# Patient Record
Sex: FEMALE | Race: WHITE
[De-identification: names, ages, dates, MRNs, and addresses within clinical notes are randomized per-mention and may not be internally consistent; named-entity substitution may affect disease eponyms.]

---

## 2017-06-16 ENCOUNTER — HOSPITAL ENCOUNTER (OUTPATIENT)
Dept: HOSPITAL 47 - RADMRIMAIN | Age: 27
Discharge: HOME | End: 2017-06-16
Payer: COMMERCIAL

## 2017-06-16 DIAGNOSIS — Z88.0: ICD-10-CM

## 2017-06-16 DIAGNOSIS — M51.27: Primary | ICD-10-CM

## 2017-06-16 DIAGNOSIS — M48.8X6: ICD-10-CM

## 2017-06-16 PROCEDURE — 72148 MRI LUMBAR SPINE W/O DYE: CPT

## 2017-06-16 NOTE — MR
EXAMINATION TYPE: MR lumbar spine wo con

 

DATE OF EXAM: 6/16/2017

 

COMPARISON: NONE

 

HISTORY: Lumbago pain and stiffness

 

CONTRAST: 0 mL intravenous .

 

TECHNIQUE: 

Multiplanar, multisequence images of the lumbar spine were acquired.

 

FINDINGS:  Cord terminates L1-L2 level.

 

L5-S1: There is a small central protrusion.  No spinal canal stenosis.  No foraminal stenosis.  Mild 
facet hypertrophy is present..

 

L4-L5: No significant disc bulge or disc herniation.  No spinal canal stenosis.  No foraminal stenosi
s.  Facet hypertrophy is contributing to posterior lateral thecal sac compression..

 

L3-L4: No significant disc bulge or disc herniation.  No spinal canal stenosis.  No foraminal stenosi
s. .

 

L2-L3: No significant disc bulge or disc herniation.  No spinal canal stenosis.  No foraminal stenosi
s. .

 

L1-L2: No significant disc bulge or disc herniation.  No spinal canal stenosis.  No foraminal stenosi
s. .

 

T12-L1: No significant disc bulge or disc herniation.  No spinal canal stenosis.  No foraminal stenos
is.

 

Cord signal in the distal spinal cord appears normal.

 

IMPRESSION:

1. Facet hypertrophy is mild posterior lateral thecal sac compression L4-5.

2. Mild central protrusion with mild anterior thecal sac compression L5-S1.

## 2018-02-22 ENCOUNTER — HOSPITAL ENCOUNTER (OUTPATIENT)
Dept: HOSPITAL 47 - RADUSWWP | Age: 28
Discharge: HOME | End: 2018-02-22
Payer: COMMERCIAL

## 2018-02-22 DIAGNOSIS — N92.0: Primary | ICD-10-CM

## 2018-02-22 PROCEDURE — 76856 US EXAM PELVIC COMPLETE: CPT

## 2018-02-22 NOTE — US
EXAMINATION TYPE: US pelvic complete

 

DATE OF EXAM: 2018

 

COMPARISON: NONE

 

CLINICAL HISTORY: 27-year-old female N92.8 Excessive And Frequent Regular Cycle. Heavy painful period
s x 5 years,

 

 3, para 3, history of tubal ligation

 

TECHNIQUE:  Transabdominal sonographic images of the pelvis were acquired.  

 

Date of LMP:  2018

 

FINDINGS:

 

Uterus:  Anteverted measuring 8.3 x 4.0 x 5.2 cm. Cervical nabothian cyst is demonstrated.

 

Endometrial Stripe: 1.0 cm, within normal limits.

 

Right Ovary:  2.9 x 2.4 x 1.7 cm, within normal limits.

 

Left Ovary:  5.0 x 2.6 x 1.9 cm for a volume of 12.9 mL. There is follicular change with a dominant f
ollicle/functional cyst measuring up to 1.9 cm.

 

No evident adnexal abnormality. Small amount of cul-de-sac free fluid likely physiologic.

 

 

 

 

 

IMPRESSION: 

1. Follicular change in the left ovary with a 1.9 cm dominant follicle or functional cyst.

2. Small amount of cul-de-sac free fluid likely physiologic.

## 2018-10-29 ENCOUNTER — HOSPITAL ENCOUNTER (OUTPATIENT)
Dept: HOSPITAL 47 - RADMRIMAIN | Age: 28
End: 2018-10-29
Attending: PSYCHIATRY & NEUROLOGY
Payer: COMMERCIAL

## 2018-10-29 DIAGNOSIS — R90.89: Primary | ICD-10-CM

## 2018-10-29 PROCEDURE — 70553 MRI BRAIN STEM W/O & W/DYE: CPT

## 2018-10-29 NOTE — MR
EXAMINATION TYPE: MR brain wo/w con

 

DATE OF EXAM: 10/29/2018

 

COMPARISON: Prior MRI brain April 14, 2016. CT brain June 26, 2016.

 

HISTORY: White matter changes, migraines headaches.

 

TECHNIQUE: 

Multiplanar, multisequence images of the brain and brainstem is performed without and with IV contras
t, utilizing 5 mL intravenous Gadavist gadolinium contrast is administered intravenously.  Demyelinat
ing disease protocol with additional Sagittal Flair sequence performed.

 

FINDINGS: 

T2 Lesions Present :  Yes

Approximate Number of Lesions: Approximately 4 on current study

Locations Identified : Scattered

Size of Reference Lesion(s): 

1. 6 x 2 x 4 mm left frontal lesion on axial image 17 and sagittal image 12 stable

New punctate lesion identified sagittal image 19 right frontal lobe and sagittal image 22 right occip
ital lobe

Enhancing Lesion(s) Present: No

T1 Hypointense Lesion(s) Present: No

Change from Prior: 2 -3 additional lesions may be product of interval change in technique versus new 
lesions.

 

Diffusion weighted images demonstrate no evidence of a recent infarct or other diffusion abnormality.
  There is no worrisome extra-axial fluid collection.  The ventricular system and cisternal spaces ar
e normal in size and appearance.  The brain volume is age appropriate.

 

Midline structures demonstrate normal morphology.  The craniocervical junction appears within normal 
limits.  Post contrast images demonstrate no abnormal enhancement. The dural venous sinuses appear pa
tent. Stable tiny mucous retention cyst or polyp anterior right maxillary sinusitis image 7. Patchy f
luid signal deep left mastoid air cells redemonstrated. Globes are intact bilaterally.

 

IMPRESSION: Minimal nonspecific white matter changes with possible new 2-3 small lesions. No enhancin
g lesions are seen.

## 2023-10-23 ENCOUNTER — HOSPITAL ENCOUNTER (EMERGENCY)
Dept: HOSPITAL 47 - EC | Age: 33
Discharge: HOME | End: 2023-10-23
Payer: COMMERCIAL

## 2023-10-23 VITALS — TEMPERATURE: 98 F | RESPIRATION RATE: 18 BRPM

## 2023-10-23 VITALS — SYSTOLIC BLOOD PRESSURE: 125 MMHG | DIASTOLIC BLOOD PRESSURE: 82 MMHG | HEART RATE: 76 BPM

## 2023-10-23 DIAGNOSIS — F17.200: ICD-10-CM

## 2023-10-23 DIAGNOSIS — F41.9: ICD-10-CM

## 2023-10-23 DIAGNOSIS — Z88.8: ICD-10-CM

## 2023-10-23 DIAGNOSIS — F90.9: ICD-10-CM

## 2023-10-23 DIAGNOSIS — S90.31XA: Primary | ICD-10-CM
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
## 2023-10-23 PROCEDURE — 29515 APPLICATION SHORT LEG SPLINT: CPT

## 2023-10-23 PROCEDURE — 99283 EMERGENCY DEPT VISIT LOW MDM: CPT

## 2023-10-23 NOTE — ED
General Adult HPI





- General


Chief complaint: Extremity Injury, Lower


Stated complaint: FALL, RIGHT FOOT INJURY


Time Seen by Provider: 10/23/23 17:03


Source: patient, RN notes reviewed


Mode of arrival: ambulatory


Limitations: no limitations





- History of Present Illness


Initial comments: 





32-year-old female presents to the emergency department chief complaint of right

foot pain.  She states that prior to arrival she was walking down some stairs 

when she tripped causing her to fall down 4 stairs.  She states that she landed 

on the lateral aspect of her right foot and admits to pain in this area.  She is

unable to bear weight on the foot.  She denies any other injury.  She denies 

hitting her head, loss of consciousness, blood thinners.





- Related Data


                                Home Medications











 Medication  Instructions  Recorded  Confirmed


 


Albuterol Inhaler [Ventolin 1 - 2 puff INHALATION Q6HR PRN 05/27/15 06/26/16





Inhaler]   


 


Dextroamphetamine/Amphetamine 20 mg PO DAILY 05/27/15 06/26/16





[Adderall]   


 


Verapamil HCl [Verapamil ER] 180 mg PO DAILY 05/27/15 06/26/16


 


Butalb/Acetaminophen/Caffeine 1 - 2 cap PO Q4HR 12/30/15 06/26/16





[Fioricet -40 mg Capsule]   


 


SUMAtriptan succinate [Imitrex] 100 mg PO ONCE PRN 05/12/16 06/26/16








                                  Previous Rx's











 Medication  Instructions  Recorded


 


Naproxen [Naprosyn] 500 mg PO Q12HR #30 tab 05/28/15


 


HYDROcodone/APAP 5-325MG [Norco 5] 1 each PO Q4HR PRN #20 tab 12/30/15











                                    Allergies











Allergy/AdvReac Type Severity Reaction Status Date / Time


 


folic acid Allergy  Nausea & Verified 10/23/23 17:02





   Vomiting  














Review of Systems


ROS Statement: 


Those systems with pertinent positive or pertinent negative responses have been 

documented in the HPI.





ROS Other: All systems not noted in ROS Statement are negative.





Past Medical History


Additional Past Medical History / Comment(s): MIGRAINES


History of Any Multi-Drug Resistant Organisms: None Reported


Additional Past Surgical History / Comment(s): LEFT EYE SURGERY


Past Anesthesia/Blood Transfusion Reactions: No Reported Reaction


Past Psychological History: ADD/ADHD, Anxiety


Smoking Status: Current every day smoker


Past Alcohol Use History: Occasional


Past Drug Use History: None Reported





- Past Family History


  ** Father


Family Medical History: Cancer, Deep Vein Thrombosis (DVT), Pulmonary Embolus


Additional Family Medical History / Comment(s): LEUKEMIA





General Exam


Limitations: no limitations


General appearance: alert, in no apparent distress


Head exam: Present: atraumatic, normocephalic, normal inspection


ENT exam: Present: normal exam, mucous membranes moist


Extremities exam: Present: normal inspection, full ROM, tenderness, normal 

capillary refill, other (DP and PT pulses 2+, ecchymosis to the plantar surface 

of right foot).  Absent: pedal edema, joint swelling, calf tenderness


Back exam: Present: normal inspection


Neurological exam: Present: alert, oriented X3


Psychiatric exam: Present: normal affect, normal mood


Skin exam: Present: warm, dry, intact, other (Ecchymosis to plantar surface of 

right lateral foot)





Course


                                   Vital Signs











  10/23/23 10/23/23





  16:59 19:42


 


Temperature 98.0 F 


 


Pulse Rate 81 76


 


Respiratory 18 18





Rate  


 


Blood Pressure 133/78 125/82


 


O2 Sat by Pulse 99 98





Oximetry  














Procedures





- Orthopedic Splinting/Casting


  ** Injury #1


Side: right


Lower Extremity Injury Location: short leg


Other Orthopedic Equipment: crutches





Medical Decision Making





- Medical Decision Making





Was pt. sent in by a medical professional or institution (, PA, NP, urgent 

care, hospital, or nursing home...) When possible be specific


@  -No


Did you speak to anyone other than the patient for history (EMS, parent, family,

police, friend...)? What history was obtained from this source 


@  -No


Did you review nursing and triage notes (agree or disagree)?  Why? 


@  -I reviewed and agree with nursing and triage notes


Were old charts reviewed (outside hosp., previous admission, EMS record, old 

EKG, old radiological studies, urgent care reports/EKG's, nursing home records)?

Report findings 


@  -No old charts were reviewed


Differential Diagnosis (chest pain, altered mental status, abdominal pain women,

abdominal pain men, vaginal bleeding, weakness, fever, dyspnea, syncope, 

headache, dizziness, GI bleed, back pain, seizure, CVA, palpatations, mental 

health, musculoskeletal)? 


@  -Differential Musculoskeletal


Muscular strain, contusion, ligament sprain, fracture, arthritis, septic 

arthritis, bursitis, cellulitis, muscle spasm, nerve compression, DVT, arterial 

occlusion, herpes zoster, electrolyte abnormality, tumor.... This is not meant 

to be in all inclusive list


EKG interpreted by me (3pts min.).


@  -None


X-rays interpreted by me (1pt min.).


@  -X-ray right foot shows no acute osseous abnormality


CT interpreted by me (1pt min.).


@  -None done


U/S interpreted by me (1pt. min.).


@  -None done


What testing was considered but not performed or refused? (CT, X-rays, U/S, 

labs)? Why?


@  -None


What meds were considered but not given or refused? Why?


@  -None


Did you discuss the management of the patient with other professionals 

(professionals i.e. , PA, NP, lab, RT, psych nurse, , , 

teacher, , )? Give summary


@  -No


Was smoking cessation discussed for >3mins.?


@  -No


Was critical care preformed (if so, how long)?


@  -No


Were there social determinants of health that impacted care today? How? 

(Homelessness, low income, unemployed, alcoholism, drug addiction, 

transportation, low edu. Level, literacy, decrease access to med. care, halfway, 

rehab)?


@  -No


Was there de-escalation of care discussed even if they declined (Discuss DNR or 

withdrawal of care, Hospice)? DNR status


@  -No


What co-morbidities impacted this encounter? (DM, HTN, Smoking, COPD, CAD, 

Cancer, CVA, ARF, Chemo, Hep., AIDS, mental health diagnosis, sleep apnea, 

morbid obesity)?


@  -None


Was patient admitted / discharged? Hospital course, mention meds given and 

route, prescriptions, significant lab abnormalities, going to OR and other 

pertinent info.


@  -Discharged.  Patient presented emergency department chief complaint of right

foot pain following falling down the stairs.  She denies any other injuries.  

Denies hitting her head, loss of consciousness.  She states that she feels she 

is unable to bear weight on her right foot because of the injury.  X-ray 

obtained which shows no acute osseous abnormality.  Discussed findings with 

patient.  Patient was placed in a short-leg splint for possible occult fracture 

as she is unable to bear weight.  She will follow-up with orthopedics.  Patient 

understanding and agreeable with plan.  Patient stable at time of discharge.  

Case discussed with Dr. Barclay. 


Undiagnosed new problem with uncertain prognosis?


@  -No


Drug Therapy requiring intensive monitoring for toxicity (Heparin, Nitro, 

Insulin, Cardizem)?


@  -No


Were any procedures done?


@  -No


Diagnosis/symptom?


@  -Foot contusion


Acute, or Chronic, or Acute on Chronic?


@  -Acute


Uncomplicated (without systemic symptoms) or Complicated (systemic symptoms)?


@  -Uncomplicated


Side effects of treatment?


@  -No


Exacerbation, Progression, or Severe Exacerbation?


@  -No


Poses a threat to life or bodily function? How? (Chest pain, USA, MI, pneumonia,

PE, COPD, DKA, ARF, appy, cholecystitis, CVA, Diverticulitis, Homicidal, 

Suicidal, threat to staff... and all critical care pts)


@  -No





Disposition


Clinical Impression: 


 Right foot injury





Disposition: HOME SELF-CARE


Condition: Stable


Instructions (If sedation given, give patient instructions):  Foot Contusion 

(ED)


Is patient prescribed a controlled substance at d/c from ED?: No


Referrals: 


Mike Roldan DO [Primary Care Provider] - 1-2 days


Anshu Valenzuela DO [Doctor of Osteopathic Medicine] - 1-2 days

## 2023-10-23 NOTE — XR
EXAMINATION TYPE: XR foot complete RT

 

DATE OF EXAM: 10/23/2023

 

COMPARISON: None

 

HISTORY: Fall, pain

 

TECHNIQUE: 3 view right foot

 

FINDINGS: Joint spaces are preserved. No acute fracture or dislocation is evident. Soft tissues are n
ormal.

 

Follow-up exam can be performed 7-10 days from acute trauma pain.

 

IMPRESSION:

1.  No acute osseous abnormality right foot